# Patient Record
Sex: MALE | Race: OTHER | NOT HISPANIC OR LATINO | Employment: UNEMPLOYED | URBAN - METROPOLITAN AREA
[De-identification: names, ages, dates, MRNs, and addresses within clinical notes are randomized per-mention and may not be internally consistent; named-entity substitution may affect disease eponyms.]

---

## 2021-09-22 ENCOUNTER — PREP FOR PROCEDURE (OUTPATIENT)
Dept: DENTISTRY | Facility: CLINIC | Age: 36
End: 2021-09-22

## 2021-09-22 DIAGNOSIS — K02.9 DENTAL CARIES: Primary | ICD-10-CM

## 2022-01-17 ENCOUNTER — ANESTHESIA EVENT (OUTPATIENT)
Dept: PERIOP | Facility: HOSPITAL | Age: 37
End: 2022-01-17
Payer: MEDICARE

## 2022-01-17 RX ORDER — RISPERIDONE 1 MG/1
0.5 TABLET, FILM COATED ORAL 2 TIMES DAILY
COMMUNITY

## 2022-01-17 NOTE — PRE-PROCEDURE INSTRUCTIONS
Pre-Surgery Instructions:  Have you had / have a sore throat? NO  have you had / have a cough less than 1 week? NO  Have you had / have a fever greater than 100 0 - 100  4? NO  Are you experiencing any shortness of breath? NO       Medication Instructions    risperiDONE (RisperDAL) 1 mg tablet Instructed patient per Anesthesia Guidelines     mAY [address] [facility] [place]

## 2022-01-18 ENCOUNTER — HOSPITAL ENCOUNTER (OUTPATIENT)
Facility: HOSPITAL | Age: 37
Setting detail: OUTPATIENT SURGERY
Discharge: HOME | End: 2022-01-18
Attending: DENTIST | Admitting: DENTIST
Payer: MEDICARE

## 2022-01-18 ENCOUNTER — ANESTHESIA (OUTPATIENT)
Dept: PERIOP | Facility: HOSPITAL | Age: 37
End: 2022-01-18
Payer: MEDICARE

## 2022-01-18 VITALS
BODY MASS INDEX: 21.87 KG/M2 | WEIGHT: 165 LBS | SYSTOLIC BLOOD PRESSURE: 123 MMHG | DIASTOLIC BLOOD PRESSURE: 83 MMHG | OXYGEN SATURATION: 100 % | HEART RATE: 92 BPM | HEIGHT: 73 IN | TEMPERATURE: 98 F | RESPIRATION RATE: 18 BRPM

## 2022-01-18 RX ORDER — BUSPIRONE HYDROCHLORIDE 7.5 MG/1
7.5 TABLET ORAL 3 TIMES DAILY
COMMUNITY

## 2022-01-18 RX ORDER — KETOROLAC TROMETHAMINE 30 MG/ML
INJECTION, SOLUTION INTRAMUSCULAR; INTRAVENOUS AS NEEDED
Status: DISCONTINUED | OUTPATIENT
Start: 2022-01-18 | End: 2022-01-18

## 2022-01-18 RX ORDER — HYDROMORPHONE HCL/PF 1 MG/ML
0.4 SYRINGE (ML) INJECTION
Status: DISCONTINUED | OUTPATIENT
Start: 2022-01-18 | End: 2022-01-18 | Stop reason: HOSPADM

## 2022-01-18 RX ORDER — PROPOFOL 10 MG/ML
INJECTION, EMULSION INTRAVENOUS AS NEEDED
Status: DISCONTINUED | OUTPATIENT
Start: 2022-01-18 | End: 2022-01-18

## 2022-01-18 RX ORDER — DEXAMETHASONE SODIUM PHOSPHATE 10 MG/ML
INJECTION, SOLUTION INTRAMUSCULAR; INTRAVENOUS AS NEEDED
Status: DISCONTINUED | OUTPATIENT
Start: 2022-01-18 | End: 2022-01-18

## 2022-01-18 RX ORDER — GLYCOPYRROLATE 0.2 MG/ML
INJECTION INTRAMUSCULAR; INTRAVENOUS AS NEEDED
Status: DISCONTINUED | OUTPATIENT
Start: 2022-01-18 | End: 2022-01-18

## 2022-01-18 RX ORDER — BENZTROPINE MESYLATE 0.5 MG/1
0.5 TABLET ORAL 2 TIMES DAILY
COMMUNITY

## 2022-01-18 RX ORDER — ONDANSETRON 2 MG/ML
INJECTION INTRAMUSCULAR; INTRAVENOUS AS NEEDED
Status: DISCONTINUED | OUTPATIENT
Start: 2022-01-18 | End: 2022-01-18

## 2022-01-18 RX ORDER — LIDOCAINE HYDROCHLORIDE AND EPINEPHRINE 20; 5 MG/ML; UG/ML
INJECTION, SOLUTION EPIDURAL; INFILTRATION; INTRACAUDAL; PERINEURAL AS NEEDED
Status: DISCONTINUED | OUTPATIENT
Start: 2022-01-18 | End: 2022-01-18 | Stop reason: HOSPADM

## 2022-01-18 RX ORDER — MIDAZOLAM HYDROCHLORIDE 2 MG/2ML
INJECTION, SOLUTION INTRAMUSCULAR; INTRAVENOUS AS NEEDED
Status: DISCONTINUED | OUTPATIENT
Start: 2022-01-18 | End: 2022-01-18

## 2022-01-18 RX ORDER — LIDOCAINE HYDROCHLORIDE 10 MG/ML
0.5 INJECTION, SOLUTION EPIDURAL; INFILTRATION; INTRACAUDAL; PERINEURAL ONCE AS NEEDED
Status: COMPLETED | OUTPATIENT
Start: 2022-01-18 | End: 2022-01-18

## 2022-01-18 RX ORDER — SODIUM CHLORIDE, SODIUM LACTATE, POTASSIUM CHLORIDE, CALCIUM CHLORIDE 600; 310; 30; 20 MG/100ML; MG/100ML; MG/100ML; MG/100ML
75 INJECTION, SOLUTION INTRAVENOUS CONTINUOUS
Status: DISCONTINUED | OUTPATIENT
Start: 2022-01-18 | End: 2022-01-18 | Stop reason: HOSPADM

## 2022-01-18 RX ORDER — ONDANSETRON 2 MG/ML
4 INJECTION INTRAMUSCULAR; INTRAVENOUS ONCE AS NEEDED
Status: DISCONTINUED | OUTPATIENT
Start: 2022-01-18 | End: 2022-01-18 | Stop reason: HOSPADM

## 2022-01-18 RX ORDER — FENTANYL CITRATE/PF 50 MCG/ML
25 SYRINGE (ML) INJECTION
Status: DISCONTINUED | OUTPATIENT
Start: 2022-01-18 | End: 2022-01-18 | Stop reason: HOSPADM

## 2022-01-18 RX ORDER — CEFAZOLIN SODIUM 1 G/3ML
INJECTION, POWDER, FOR SOLUTION INTRAMUSCULAR; INTRAVENOUS AS NEEDED
Status: DISCONTINUED | OUTPATIENT
Start: 2022-01-18 | End: 2022-01-18

## 2022-01-18 RX ORDER — NEOSTIGMINE METHYLSULFATE 1 MG/ML
INJECTION INTRAVENOUS AS NEEDED
Status: DISCONTINUED | OUTPATIENT
Start: 2022-01-18 | End: 2022-01-18

## 2022-01-18 RX ORDER — ROCURONIUM BROMIDE 10 MG/ML
INJECTION, SOLUTION INTRAVENOUS AS NEEDED
Status: DISCONTINUED | OUTPATIENT
Start: 2022-01-18 | End: 2022-01-18

## 2022-01-18 RX ORDER — LIDOCAINE HYDROCHLORIDE 10 MG/ML
INJECTION, SOLUTION EPIDURAL; INFILTRATION; INTRACAUDAL; PERINEURAL AS NEEDED
Status: DISCONTINUED | OUTPATIENT
Start: 2022-01-18 | End: 2022-01-18

## 2022-01-18 RX ORDER — FENTANYL CITRATE 50 UG/ML
INJECTION, SOLUTION INTRAMUSCULAR; INTRAVENOUS AS NEEDED
Status: DISCONTINUED | OUTPATIENT
Start: 2022-01-18 | End: 2022-01-18

## 2022-01-18 RX ADMIN — KETOROLAC TROMETHAMINE 15 MG: 30 INJECTION, SOLUTION INTRAMUSCULAR at 15:56

## 2022-01-18 RX ADMIN — ROCURONIUM BROMIDE 40 MG: 50 INJECTION, SOLUTION INTRAVENOUS at 12:25

## 2022-01-18 RX ADMIN — MIDAZOLAM 2 MG: 1 INJECTION INTRAMUSCULAR; INTRAVENOUS at 11:58

## 2022-01-18 RX ADMIN — ONDANSETRON 4 MG: 2 INJECTION INTRAMUSCULAR; INTRAVENOUS at 15:56

## 2022-01-18 RX ADMIN — SODIUM CHLORIDE, SODIUM LACTATE, POTASSIUM CHLORIDE, AND CALCIUM CHLORIDE 75 ML/HR: .6; .31; .03; .02 INJECTION, SOLUTION INTRAVENOUS at 11:06

## 2022-01-18 RX ADMIN — FENTANYL CITRATE 100 MCG: 50 INJECTION INTRAMUSCULAR; INTRAVENOUS at 12:25

## 2022-01-18 RX ADMIN — NEOSTIGMINE METHYLSULFATE 3 MG: 1 INJECTION INTRAVENOUS at 16:01

## 2022-01-18 RX ADMIN — LIDOCAINE HYDROCHLORIDE 0.5 ML: 10 INJECTION, SOLUTION EPIDURAL; INFILTRATION; INTRACAUDAL; PERINEURAL at 11:06

## 2022-01-18 RX ADMIN — PROPOFOL 50 MG: 10 INJECTION, EMULSION INTRAVENOUS at 12:28

## 2022-01-18 RX ADMIN — LIDOCAINE HYDROCHLORIDE 50 MG: 10 INJECTION, SOLUTION EPIDURAL; INFILTRATION; INTRACAUDAL; PERINEURAL at 12:25

## 2022-01-18 RX ADMIN — FENTANYL CITRATE 50 MCG: 50 INJECTION INTRAMUSCULAR; INTRAVENOUS at 13:56

## 2022-01-18 RX ADMIN — FENTANYL CITRATE 50 MCG: 50 INJECTION INTRAMUSCULAR; INTRAVENOUS at 14:58

## 2022-01-18 RX ADMIN — GLYCOPYRROLATE 0.4 MG: 0.2 INJECTION, SOLUTION INTRAMUSCULAR; INTRAVENOUS at 16:01

## 2022-01-18 RX ADMIN — DEXAMETHASONE SODIUM PHOSPHATE 8 MG: 10 INJECTION, SOLUTION INTRAMUSCULAR; INTRAVENOUS at 12:50

## 2022-01-18 RX ADMIN — PHENYLEPHRINE HYDROCHLORIDE 2 DROP: 1 SPRAY NASAL at 12:12

## 2022-01-18 RX ADMIN — PROPOFOL 200 MG: 10 INJECTION, EMULSION INTRAVENOUS at 12:25

## 2022-01-18 RX ADMIN — CEFAZOLIN 2000 MG: 1 INJECTION, POWDER, FOR SOLUTION INTRAMUSCULAR; INTRAVENOUS at 12:20

## 2022-01-18 NOTE — ANESTHESIA PREPROCEDURE EVALUATION
Procedure:  RESTORATIVE DENTAL PROCEDURE (N/A Nose)    Relevant Problems   CARDIO   (+) Hyperlipidemia      Other   (+) Allergic rhinitis due to pollen   (+) Autism spectrum disorder   (+) Vitamin D deficiency             Anesthesia Plan  ASA Score- 2     Anesthesia Type- general with ASA Monitors  Additional Monitors:   Airway Plan: ETT  Plan Factors-Exercise tolerance (METS): >4 METS  Chart reviewed  EKG reviewed  Imaging results reviewed  Existing labs reviewed  Patient summary reviewed  Patient is not a current smoker  Induction- intravenous      Postoperative Plan-     Informed Consent-

## 2022-01-18 NOTE — DISCHARGE INSTRUCTIONS
Discharge patient when stable  Tooth Extraction   WHAT YOU NEED TO KNOW:   A tooth extraction is a procedure to remove 1 or more teeth  A tooth extraction can cause pain, swelling, and minor bleeding  It can also cause you to have trouble opening your mouth completely  DISCHARGE INSTRUCTIONS:   Seek care immediately if:   · You have bleeding that has not decreased within 12 hours after your tooth extraction  Contact your dentist or oral surgeon if:   · You have a fever  · You have severe, throbbing pain and swelling that do not improve with treatment  · You have a foul taste or drainage coming from the extraction site  · You feel like your teeth have moved or that your teeth are not aligned  · You have numbness or tingling in your mouth  · You still cannot fully open your mouth 1 week after your tooth extraction  · You have questions or concerns about your condition or care  Medicines: You may need any of the following:  · Acetaminophen  decreases pain  It is available without a doctor's order  Ask how much to take and how often to take it  Follow directions  Acetaminophen can cause liver damage if not taken correctly  · Prescription pain medicine  may be given  Ask your healthcare provider how to take this medicine safely  Some prescription pain medicines contain acetaminophen  Do not take other medicines that contain acetaminophen without talking to your healthcare provider  Too much acetaminophen may cause liver damage  Prescription pain medicine may cause constipation  Ask your healthcare provider how to prevent or treat constipation  · Take your medicine as directed  Contact your healthcare provider if you think your medicine is not helping or if you have side effects  Tell him or her if you are allergic to any medicine  Keep a list of the medicines, vitamins, and herbs you take  Include the amounts, and when and why you take them   Bring the list or the pill bottles to follow-up visits  Carry your medicine list with you in case of an emergency  Self-care:   · Keep your gauze in place for 2 hours after your procedure  This helps to control bleeding by forming a blood clot  · Do not rinse your mouth for 24 hours  This helps decrease your risk for dry socket  Dry socket is a condition that prevents a blood clot from forming on the extraction site as it should, or it gets dislodged  Dry socket can cause severe pain  · Do not smoke or drink from a straw for 3 days  You may develop a dry socket if you smoke or use a straw  · Eat only soft foods and liquids for 24 hours  This helps control pain  · Apply ice on any swollen areas of your face for 15 to 20 minutes every hour or as directed  Use an ice pack, or put crushed ice in a plastic bag  Cover it with a towel before you apply it to your skin  Ice helps prevent tissue damage and decreases swelling and pain  · To help decrease swelling, sleep with your head elevated  Do not sleep on your side  · Avoid exercise as directed  Exercise can increase your blood pressure and make your extraction site bleed  Follow up with your dentist or oral surgeon as directed:  Write down your questions so you remember to ask them during your visits  © Copyright [employer] 2021 Information is for End User's use only and may not be sold, redistributed or otherwise used for commercial purposes  All illustrations and images included in CareNotes® are the copyrighted property of [employer]  or [employer] Rice  The above information is an  only  It is not intended as medical advice for individual conditions or treatments  Talk to your doctor, nurse or pharmacist before following any medical regimen to see if it is safe and effective for you

## 2022-01-18 NOTE — ANESTHESIA POSTPROCEDURE EVALUATION
Post-Op Assessment Note    CV Status:  Stable  Pain Score: 0    Pain management: adequate     Mental Status:  Alert and awake   Hydration Status:  Euvolemic   PONV Controlled:  Controlled   Airway Patency:  Patent      Post Op Vitals Reviewed: Yes      Staff: Anesthesiologist, CRNA         No complications documented      BP   128/78   Temp  97 2   Pulse  85   Resp   14   SpO2   100 no

## 2022-01-18 NOTE — INTERVAL H&P NOTE
H&P reviewed  After examining the patient I find no changes in the patients condition since the H&P had been written      Vitals:    01/18/22 1015   BP: 119/85   Pulse: 72   Resp: 18   Temp: (!) 97 4 °F (36 3 °C)   SpO2: 100%

## 2022-01-18 NOTE — DISCHARGE SUMMARY
Discharge patient when stable  POST OPERATIVE INSTRUCTIONS FOLLOWING ORAL SURGERY    Swelling: To reduce swelling, place ice bag on your face up to 12 hours following surgery  This is an important factor in keeping swelling to a minimum  Swelling is common and need not cause alarm  Rinsing: DO NOT RINSE for the first 12 hours after surgery  After 24 hours it is important to rinse, using warm salt water (not over the counter mouthwash) 3 to 4 times a day, particularly after eating  Brush areas of mouth not affected by the surgery starting tomorrow  Spitting: DO NOT SPIT OUT frequent spitting will cause bleeding to continue  Exercise Jaw: In some cases following oral surgery, it becomes difficult to open your mouth  Exercise your jaw frequently by attempting to open your mouth wide  You may experience discomfort at first, however, with continued exercise the discomfort is reduced  Diet: After having oral surgery it is recommended the patient maintain a semi-liquid diet for 24 hours  A regular diet should be resumed as soon as possible, avoiding peanuts, pretzels, and foods with seeds  Vomiting: Occasionally, patients will have nausea after surgery  Tea, ginger ale, and soup broth will help this complication  Pain: A prescription for pain relieving drugs is given when surgery is extensive  For lesser surgical procedures, it is recommended the patient use Motrin or Advil  If you are in pain and the drug you are taking does not help, please contact us and we will try to remedy the situation  Bleeding: Bite on gauze for 30 minutes  If the bleeding continues, bite on new gauze for an additional 30 minutes  If bleeding continues, place a damp tea bag over the socket and continue to bite down for an additional 30 minutes  Frequent gauze changes allow bleeding to continue  Smoking: It is important you DO NOT SMOKE after surgery  Smoking caused dry socket, which is very painful      Concerns: [facility] dental clinic at [phone]    Emergency: Go to the EMERGENCY ROOM at [facility] and ask for the Oral Surgeon on call  DO NOT WAIT until your post-operative appointment to consult [facility] [phone]

## 2022-01-18 NOTE — OP NOTE
PERATIVE REPORT  PATIENT NAME: Mindi Sandoval    :  1985  MRN: [id number]  Pt Location:  OR ROOM 03    SURGERY DATE: 2022    Surgeon(s) and Role:     Marianne Montero, DDS - Primary     * Nuris Benton, DDS - secondary      Lexii Hernandez, charless- secondary    Preop Diagnosis:  Dental caries [K02 9]    Post-Op Diagnosis Codes:     * Dental caries [K02 9]    Procedure(s) (LRB):  RESTORATIVE DENTAL PROCEDURE (N/A)    Specimen(s):  None  Estimated Blood Loss:   Minimal    Drains:  None    Anesthesia Type:   General/ local     Operative Indications:  Dental caries [K02 9]    Operative Findings:  Decayed # 3, 4, 13, 14, 15, 16, 18, 19, 30, 31 and 32    Complications:   none    Procedure and Technique:   Procedure Description:    Patient does not have a significant medical historyor allergies  Dental screening was preformed on 2020  He presented for initial exam and OR eval  Risks/benefits of tx & GA reviewed; all questions answered and informed consent obtained And consent form was signed by the the patient  Pre-admission testing was completed by PCP  General anesthesia was preformed with nasal intubation of  Sevoflurane: A Time Out was performed verifying patient procedure and laterality  Vitals, associated labs  P  FMX taken  Tx plan reviewed after examining all dentitions  Cavitron ,  Removed decay on teeth # 3-B, 4_O , 14 OBL, 19 OB, 31 OB and 32 - O  Etch, ware, flowable , #19 Packable  Finished, polished  6 carpules (1 7 ml each) of 2% lidocaine 1:100,000 epinephrine was used to anesthetized LL/UL and LR    The gingival cuff of tooth # 18  was reflected  Used Cowhorn to  luxate tooth and it was extracted  Flap from #12 to 16 and elevated #13 and 15 and forceps #15    Curettage, Interrupted 3-0 suture was placed  Also incision was made around #30 and elevated both roots and smoothed bone  Positive hemostasis was achieved         Patient Disposition:  The patient tolerated procedure well  Post Op instructions were cheek biting prevention/awareness due to LA; Cold liguid diet until extraction site heals (48-72 hours)   }      SIGNATURE: Fabio Avalos DDS  DATE: January 18, 2022  TIME: 4:06 PM

## (undated) DEVICE — TUBING SUCTION 5MM X 12 FT

## (undated) DEVICE — GAUZE SPONGES,8 PLY: Brand: CURITY

## (undated) DEVICE — X-RAY DETECTABLE SPONGES,16 PLY: Brand: VISTEC

## (undated) DEVICE — DRAPE EQUIPMENT RF WAND

## (undated) DEVICE — TABLE COVER: Brand: CONVERTORS

## (undated) DEVICE — INTENDED FOR TISSUE SEPARATION, AND OTHER PROCEDURES THAT REQUIRE A SHARP SURGICAL BLADE TO PUNCTURE OR CUT.: Brand: BARD-PARKER ® CARBON RIB-BACK BLADES

## (undated) DEVICE — CLAMP TOWEL TUBING DISPOSABLE

## (undated) DEVICE — DRAPE SHEET THREE QUARTER

## (undated) DEVICE — MEDI-VAC YANK SUCT HNDL W/TPRD BULBOUS TIP: Brand: CARDINAL HEALTH

## (undated) DEVICE — 1820 FOAM BLOCK NEEDLE COUNTER: Brand: DEVON

## (undated) DEVICE — LIGHT GLOVE GREEN

## (undated) DEVICE — MAYO STAND COVER: Brand: CONVERTORS